# Patient Record
Sex: FEMALE | Race: WHITE | NOT HISPANIC OR LATINO
[De-identification: names, ages, dates, MRNs, and addresses within clinical notes are randomized per-mention and may not be internally consistent; named-entity substitution may affect disease eponyms.]

---

## 2023-05-25 ENCOUNTER — NON-APPOINTMENT (OUTPATIENT)
Age: 38
End: 2023-05-25

## 2023-05-26 VITALS
HEART RATE: 130 BPM | WEIGHT: 293 LBS | SYSTOLIC BLOOD PRESSURE: 128 MMHG | HEIGHT: 63 IN | TEMPERATURE: 103 F | OXYGEN SATURATION: 99 % | RESPIRATION RATE: 20 BRPM | DIASTOLIC BLOOD PRESSURE: 90 MMHG

## 2023-05-26 LAB
ALBUMIN SERPL ELPH-MCNC: 3.7 G/DL — SIGNIFICANT CHANGE UP (ref 3.3–5)
ALP SERPL-CCNC: 81 U/L — SIGNIFICANT CHANGE UP (ref 40–120)
ALT FLD-CCNC: 32 U/L — SIGNIFICANT CHANGE UP (ref 10–45)
ANION GAP SERPL CALC-SCNC: 12 MMOL/L — SIGNIFICANT CHANGE UP (ref 5–17)
APPEARANCE UR: CLEAR — SIGNIFICANT CHANGE UP
APTT BLD: 27.7 SEC — SIGNIFICANT CHANGE UP (ref 27.5–35.5)
AST SERPL-CCNC: 30 U/L — SIGNIFICANT CHANGE UP (ref 10–40)
BACTERIA # UR AUTO: PRESENT /HPF
BASOPHILS # BLD AUTO: 0.03 K/UL — SIGNIFICANT CHANGE UP (ref 0–0.2)
BASOPHILS NFR BLD AUTO: 0.2 % — SIGNIFICANT CHANGE UP (ref 0–2)
BILIRUB SERPL-MCNC: 0.3 MG/DL — SIGNIFICANT CHANGE UP (ref 0.2–1.2)
BILIRUB UR-MCNC: NEGATIVE — SIGNIFICANT CHANGE UP
BUN SERPL-MCNC: 12 MG/DL — SIGNIFICANT CHANGE UP (ref 7–23)
CALCIUM SERPL-MCNC: 9.2 MG/DL — SIGNIFICANT CHANGE UP (ref 8.4–10.5)
CHLORIDE SERPL-SCNC: 103 MMOL/L — SIGNIFICANT CHANGE UP (ref 96–108)
CO2 SERPL-SCNC: 20 MMOL/L — LOW (ref 22–31)
COLOR SPEC: YELLOW — SIGNIFICANT CHANGE UP
CREAT SERPL-MCNC: 1.03 MG/DL — SIGNIFICANT CHANGE UP (ref 0.5–1.3)
DIFF PNL FLD: ABNORMAL
EGFR: 72 ML/MIN/1.73M2 — SIGNIFICANT CHANGE UP
EOSINOPHIL # BLD AUTO: 0.03 K/UL — SIGNIFICANT CHANGE UP (ref 0–0.5)
EOSINOPHIL NFR BLD AUTO: 0.2 % — SIGNIFICANT CHANGE UP (ref 0–6)
EPI CELLS # UR: SIGNIFICANT CHANGE UP /HPF (ref 0–5)
GLUCOSE SERPL-MCNC: 114 MG/DL — HIGH (ref 70–99)
GLUCOSE UR QL: NEGATIVE — SIGNIFICANT CHANGE UP
HCT VFR BLD CALC: 30.3 % — LOW (ref 34.5–45)
HGB BLD-MCNC: 9.7 G/DL — LOW (ref 11.5–15.5)
IMM GRANULOCYTES NFR BLD AUTO: 0.5 % — SIGNIFICANT CHANGE UP (ref 0–0.9)
INR BLD: 1.11 — SIGNIFICANT CHANGE UP (ref 0.88–1.16)
KETONES UR-MCNC: NEGATIVE — SIGNIFICANT CHANGE UP
LACTATE SERPL-SCNC: 1.9 MMOL/L — SIGNIFICANT CHANGE UP (ref 0.5–2)
LEUKOCYTE ESTERASE UR-ACNC: NEGATIVE — SIGNIFICANT CHANGE UP
LYMPHOCYTES # BLD AUTO: 0.58 K/UL — LOW (ref 1–3.3)
LYMPHOCYTES # BLD AUTO: 3.7 % — LOW (ref 13–44)
MCHC RBC-ENTMCNC: 26.4 PG — LOW (ref 27–34)
MCHC RBC-ENTMCNC: 32 GM/DL — SIGNIFICANT CHANGE UP (ref 32–36)
MCV RBC AUTO: 82.6 FL — SIGNIFICANT CHANGE UP (ref 80–100)
MONOCYTES # BLD AUTO: 0.65 K/UL — SIGNIFICANT CHANGE UP (ref 0–0.9)
MONOCYTES NFR BLD AUTO: 4.2 % — SIGNIFICANT CHANGE UP (ref 2–14)
NEUTROPHILS # BLD AUTO: 14.14 K/UL — HIGH (ref 1.8–7.4)
NEUTROPHILS NFR BLD AUTO: 91.2 % — HIGH (ref 43–77)
NITRITE UR-MCNC: NEGATIVE — SIGNIFICANT CHANGE UP
NRBC # BLD: 0 /100 WBCS — SIGNIFICANT CHANGE UP (ref 0–0)
PH UR: 6 — SIGNIFICANT CHANGE UP (ref 5–8)
PLATELET # BLD AUTO: 311 K/UL — SIGNIFICANT CHANGE UP (ref 150–400)
POTASSIUM SERPL-MCNC: 4.2 MMOL/L — SIGNIFICANT CHANGE UP (ref 3.5–5.3)
POTASSIUM SERPL-SCNC: 4.2 MMOL/L — SIGNIFICANT CHANGE UP (ref 3.5–5.3)
PROT SERPL-MCNC: 6.9 G/DL — SIGNIFICANT CHANGE UP (ref 6–8.3)
PROT UR-MCNC: NEGATIVE MG/DL — SIGNIFICANT CHANGE UP
PROTHROM AB SERPL-ACNC: 13.2 SEC — SIGNIFICANT CHANGE UP (ref 10.5–13.4)
RAPID RVP RESULT: SIGNIFICANT CHANGE UP
RBC # BLD: 3.67 M/UL — LOW (ref 3.8–5.2)
RBC # FLD: 15.8 % — HIGH (ref 10.3–14.5)
RBC CASTS # UR COMP ASSIST: ABNORMAL /HPF
SARS-COV-2 RNA SPEC QL NAA+PROBE: SIGNIFICANT CHANGE UP
SODIUM SERPL-SCNC: 135 MMOL/L — SIGNIFICANT CHANGE UP (ref 135–145)
SP GR SPEC: 1.01 — SIGNIFICANT CHANGE UP (ref 1–1.03)
UROBILINOGEN FLD QL: 0.2 E.U./DL — SIGNIFICANT CHANGE UP
WBC # BLD: 15.51 K/UL — HIGH (ref 3.8–10.5)
WBC # FLD AUTO: 15.51 K/UL — HIGH (ref 3.8–10.5)
WBC UR QL: < 5 /HPF — SIGNIFICANT CHANGE UP

## 2023-05-26 PROCEDURE — 71275 CT ANGIOGRAPHY CHEST: CPT | Mod: 26,MA

## 2023-05-26 PROCEDURE — 99285 EMERGENCY DEPT VISIT HI MDM: CPT

## 2023-05-26 RX ORDER — SODIUM CHLORIDE 9 MG/ML
1000 INJECTION INTRAMUSCULAR; INTRAVENOUS; SUBCUTANEOUS ONCE
Refills: 0 | Status: COMPLETED | OUTPATIENT
Start: 2023-05-26 | End: 2023-05-26

## 2023-05-26 RX ORDER — IBUPROFEN 200 MG
800 TABLET ORAL ONCE
Refills: 0 | Status: COMPLETED | OUTPATIENT
Start: 2023-05-26 | End: 2023-05-26

## 2023-05-26 RX ADMIN — SODIUM CHLORIDE 1000 MILLILITER(S): 9 INJECTION INTRAMUSCULAR; INTRAVENOUS; SUBCUTANEOUS at 19:36

## 2023-05-26 RX ADMIN — Medication 800 MILLIGRAM(S): at 19:36

## 2023-05-26 RX ADMIN — Medication 800 MILLIGRAM(S): at 20:11

## 2023-05-26 RX ADMIN — SODIUM CHLORIDE 1000 MILLILITER(S): 9 INJECTION INTRAMUSCULAR; INTRAVENOUS; SUBCUTANEOUS at 20:00

## 2023-05-26 NOTE — ED ADULT TRIAGE NOTE - CCCP TRG CHIEF CMPLNT
----- Message from Sallie Otero PA-C sent at 9/30/2022 10:33 AM CDT -----  Pls let pt know I filled out the paperwork yesterday and got a faxed confirmation receipt.  When we hear a response, we will let her know. Thanks, Sallie     shortness of breath

## 2023-05-26 NOTE — ED PROVIDER NOTE - CLINICAL SUMMARY MEDICAL DECISION MAKING FREE TEXT BOX
Given persistent diffuse symptoms, concern for viral syndrome, either recurrent or persistent covid in the setting of paxovid use vs other viral syndrome, will send viral testing for further diagnostic clarity.  Given extended duration of symptoms, must r/o acute thoracic process ?myocarditis ?PE ?pneumonia ?pneumothorax.  EKG without STEMI, r/o ACS given chest pressure.  R/o gross metabolic abnormality ?brandyn ?hypernatremia ?anemia given vomiting and diarrhea.

## 2023-05-26 NOTE — ED PROVIDER NOTE - NS ED ROS FT
REVIEW OF SYSTEMS  positive for: cough, shortness of breath, fever, vomiting, diarrhea, myalgias,   negative for: headache, eye pain, runny nose, sore throat, chest pain, stomach pain, dysuria, rash no previous reaction

## 2023-05-26 NOTE — ED PROVIDER NOTE - PHYSICAL EXAMINATION
General: comfortable, resting in ED  HEENT: atraumatic, no eye erythema or discharge  Pulm: no cyanosis, no added work of breathing  Cardiac: extremities warm, intact peripheral pulse  GI: abdomen soft, abdomen nontender  Neuro: alert, conversant  Psych: neutral affect, cooperative  Msk: no gross deformity or instability  Skin: no erythema or rash

## 2023-05-26 NOTE — ED ADULT NURSE NOTE - NSFALLUNIVINTERV_ED_ALL_ED
Bed/Stretcher in lowest position, wheels locked, appropriate side rails in place/Call bell, personal items and telephone in reach/Instruct patient to call for assistance before getting out of bed/chair/stretcher/Non-slip footwear applied when patient is off stretcher/[place] to call system/Physically safe environment - no spills, clutter or unnecessary equipment/Purposeful proactive rounding/Room/bathroom lighting operational, light cord in reach

## 2023-05-26 NOTE — ED PROVIDER NOTE - NSFOLLOWUPINSTRUCTIONS_ED_ALL_ED_FT
Viral Illness, Adult  Viruses are tiny germs that can get into a person's body and cause illness. There are many different types of viruses, and they cause many types of illness. Viral illnesses can range from mild to severe. They can affect various parts of the body.    Short-term conditions that are caused by a virus include colds and the flu (influenza). Long-term conditions that are caused by a virus include herpes, shingles, and HIV (human immunodeficiency virus) infection. A few viruses have been linked to certain cancers.    What are the causes?  Many types of viruses can cause illness. Viruses invade cells in your body, multiply, and cause the infected cells to work abnormally or die. When these cells die, they release more of the virus. When this happens, you develop symptoms of the illness, and the virus continues to spread to other cells. If the virus takes over the function of the cell, it can cause the cell to divide and grow out of control. This happens when a virus causes cancer.    Different viruses get into the body in different ways. You can get a virus by:  Swallowing food or water that has come in contact with the virus (is contaminated).  Breathing in droplets that have been coughed or sneezed into the air by an infected person.  Touching a surface that has been contaminated with the virus and then touching your eyes, nose, or mouth.  Being bitten by an insect or animal that carries the virus.  Having sexual contact with a person who is infected with the virus.  Being exposed to blood or fluids that contain the virus, either through an open cut or during a transfusion.  If a virus enters your body, your body's defense system (immune system) will try to fight the virus. You may be at higher risk for a viral illness if your immune system is weak.    What are the signs or symptoms?  You may have these symptoms, depending on the type of virus and the location of the cells that it invades:  Cold and flu viruses:  Fever.  Headache.  Sore throat.  Muscle aches.  Stuffy nose (nasal congestion).  Cough.  Digestive system (gastrointestinal) viruses:  Fever.  Pain in the abdomen.  Nausea.  Diarrhea.  Liver viruses (hepatitis):  Loss of appetite.  Tiredness.  Skin or the white parts of your eyes turning yellow (jaundice).  Brain and spinal cord viruses:  Fever.  Headache.  Stiff neck.  Nausea and vomiting.  Confusion or sleepiness.  Skin viruses:  Warts.  Itching.  Rash.  Sexually transmitted viruses:  Discharge.  Swelling.  Redness.  Rash.  How is this diagnosed?  This condition may be diagnosed based on one or more of the following:  Symptoms.  Medical history.  Physical exam.  Blood test, sample of mucus from your lungs (sputum sample), stool sample, or a swab of body fluids or a skin sore (lesion).  How is this treated?  Viruses can be hard to treat because they live within cells. Antibiotic medicines do not treat viruses because these medicines do not get inside cells. Treatment for a viral illness may include:  Resting and drinking plenty of fluids.  Medicines to relieve symptoms. These can include over-the-counter medicine for pain and fever, medicines for cough or congestion, and medicines to relieve diarrhea.  Antiviral medicines. These medicines are available only for certain types of viruses.  Some viral illnesses can be prevented with vaccinations. A common example is the flu shot.    Follow these instructions at home:  Medicines    Take over-the-counter and prescription medicines only as told by your health care provider.  If you were prescribed an antiviral medicine, take it as told by your health care provider. Do not stop taking the antiviral even if you start to feel better.  Be aware of when antibiotics are needed and when they are not needed. Antibiotics do not treat viruses. You may get an antibiotic if your health care provider thinks that you may have, or are at risk for, a bacterial infection and you have a viral infection.  Do not ask for an antibiotic prescription if you have been diagnosed with a viral illness. Antibiotics will not make your illness go away faster.  Frequently taking antibiotics when they are not needed can lead to antibiotic resistance. When this develops, the medicine no longer works against the bacteria that it normally fights.  General instructions      Drink enough fluids to keep your urine pale yellow.  Rest as much as possible.  Return to your normal activities as told by your health care provider. Ask your health care provider what activities are safe for you.  Keep all follow-up visits as told by your health care provider. This is important.  How is this prevented?    To reduce your risk of viral illness:  Wash your hands often with soap and water for at least 20 seconds. If soap and water are not available, use hand .  Avoid touching your nose, eyes, and mouth, especially if you have not washed your hands recently.  If anyone in your household has a viral infection, clean all household surfaces that may have been in contact with the virus. Use soap and hot water. You may also use bleach that you have added water to (diluted).  Stay away from people who are sick with symptoms of a viral infection.  Do not share items such as toothbrushes and water bottles with other people.  Keep your vaccinations up to date. This includes getting a yearly flu shot.  Eat a healthy diet and get plenty of rest.  Contact a health care provider if:  You have symptoms of a viral illness that do not go away.  Your symptoms come back after going away.  Your symptoms get worse.  Get help right away if you have:  Trouble breathing.  A severe headache or a stiff neck.  Severe vomiting or pain in your abdomen.  These symptoms may represent a serious problem that is an emergency. Do not wait to see if the symptoms will go away. Get medical help right away. Call your local emergency services (911 in the [place] Do not drive yourself to the hospital.    Summary  Viruses are types of germs that can get into a person's body and cause illness. Viral illnesses can range from mild to severe. They can affect various parts of the body.  Viruses can be hard to treat. There are medicines to relieve symptoms, and there are some antiviral medicines.  If you were prescribed an antiviral medicine, take it as told by your health care provider. Do not stop taking the antiviral even if you start to feel better.  Contact a health care provider if you have symptoms of a viral illness that do not go away.  This information is not intended to replace advice given to you by your health care provider. Make sure you discuss any questions you have with your health care provider.

## 2023-05-26 NOTE — ED PROVIDER NOTE - PROGRESS NOTE DETAILS
Will sign out to Dr. Townsend: 37F with recent covid infection s/p paxlovid, now with recurrent viral syndrome (shortness of breath, vomiting, diarrhea, myalgias), pending CT chest read r/o PNA + PE, remainder of workup including labs and UA unremarkable.

## 2023-05-26 NOTE — ED PROVIDER NOTE - NSICDXNOPASTMEDICALHX_GEN_ALL_ED
TALKED TO DR. ZACARIAS Click to add NO pertinent Past Medical History

## 2023-05-26 NOTE — ED ADULT TRIAGE NOTE - CHIEF COMPLAINT QUOTE
Pt presents to ED C/O worsening cough, SOB, chest pressure and fever at home since COV+ Dx x 4weeks ago. Sent from  for R/O PE. EKG in progress. Denies PMH. Reports on menses.

## 2023-05-26 NOTE — ED ADULT NURSE NOTE - OBJECTIVE STATEMENT
Patient states had Covid + 1 month ago, since then has not been feeling well, c/o of fever, headache, body aches and cough with fever.  Immediate upgrade sepsis upgrade.

## 2023-05-26 NOTE — ED PROVIDER NOTE - OBJECTIVE STATEMENT
37F with no past medical history, follows at PCP, with covid 1 month ago s/p paxlovid, now with persistent fever / shortness of breath / cough / chest pressure, with vomiting and diarrhea, seen at urgent care, sent to ED r/o PE.

## 2023-05-27 ENCOUNTER — INPATIENT (INPATIENT)
Facility: HOSPITAL | Age: 38
LOS: 0 days | Discharge: HOME | DRG: 315 | End: 2023-05-28
Attending: STUDENT IN AN ORGANIZED HEALTH CARE EDUCATION/TRAINING PROGRAM | Admitting: STUDENT IN AN ORGANIZED HEALTH CARE EDUCATION/TRAINING PROGRAM
Payer: COMMERCIAL

## 2023-05-27 DIAGNOSIS — R65.10 SYSTEMIC INFLAMMATORY RESPONSE SYNDROME (SIRS) OF NON-INFECTIOUS ORIGIN WITHOUT ACUTE ORGAN DYSFUNCTION: ICD-10-CM

## 2023-05-27 DIAGNOSIS — Z29.9 ENCOUNTER FOR PROPHYLACTIC MEASURES, UNSPECIFIED: ICD-10-CM

## 2023-05-27 DIAGNOSIS — I28.1 ANEURYSM OF PULMONARY ARTERY: ICD-10-CM

## 2023-05-27 DIAGNOSIS — D64.9 ANEMIA, UNSPECIFIED: ICD-10-CM

## 2023-05-27 LAB
BLD GP AB SCN SERPL QL: NEGATIVE — SIGNIFICANT CHANGE UP
BLD GP AB SCN SERPL QL: NEGATIVE — SIGNIFICANT CHANGE UP
CRP SERPL-MCNC: 60.6 MG/L — HIGH (ref 0–4)
ERYTHROCYTE [SEDIMENTATION RATE] IN BLOOD: 46 MM/HR — HIGH
FERRITIN SERPL-MCNC: 31 NG/ML — SIGNIFICANT CHANGE UP (ref 15–150)
IRON SATN MFR SERPL: 23 UG/DL — LOW (ref 30–160)
IRON SATN MFR SERPL: 7 % — LOW (ref 14–50)
PROCALCITONIN SERPL-MCNC: 1.34 NG/ML — HIGH (ref 0.02–0.1)
RH IG SCN BLD-IMP: POSITIVE — SIGNIFICANT CHANGE UP
TIBC SERPL-MCNC: 327 UG/DL — SIGNIFICANT CHANGE UP (ref 220–430)
TRANSFERRIN SERPL-MCNC: 268 MG/DL — SIGNIFICANT CHANGE UP (ref 200–360)
UIBC SERPL-MCNC: 304 UG/DL — SIGNIFICANT CHANGE UP (ref 110–370)

## 2023-05-27 PROCEDURE — 99233 SBSQ HOSP IP/OBS HIGH 50: CPT | Mod: GC

## 2023-05-27 PROCEDURE — 99223 1ST HOSP IP/OBS HIGH 75: CPT | Mod: GC

## 2023-05-27 RX ORDER — ONDANSETRON 8 MG/1
4 TABLET, FILM COATED ORAL EVERY 8 HOURS
Refills: 0 | Status: DISCONTINUED | OUTPATIENT
Start: 2023-05-27 | End: 2023-05-28

## 2023-05-27 RX ORDER — LANOLIN ALCOHOL/MO/W.PET/CERES
3 CREAM (GRAM) TOPICAL AT BEDTIME
Refills: 0 | Status: DISCONTINUED | OUTPATIENT
Start: 2023-05-27 | End: 2023-05-28

## 2023-05-27 RX ORDER — POLYETHYLENE GLYCOL 3350 17 G/17G
17 POWDER, FOR SOLUTION ORAL ONCE
Refills: 0 | Status: COMPLETED | OUTPATIENT
Start: 2023-05-27 | End: 2023-05-27

## 2023-05-27 RX ORDER — FAMOTIDINE 10 MG/ML
20 INJECTION INTRAVENOUS DAILY
Refills: 0 | Status: DISCONTINUED | OUTPATIENT
Start: 2023-05-27 | End: 2023-05-28

## 2023-05-27 RX ORDER — ACETAMINOPHEN 500 MG
650 TABLET ORAL EVERY 6 HOURS
Refills: 0 | Status: DISCONTINUED | OUTPATIENT
Start: 2023-05-27 | End: 2023-05-28

## 2023-05-27 RX ORDER — FERROUS SULFATE 325(65) MG
325 TABLET ORAL
Refills: 0 | Status: DISCONTINUED | OUTPATIENT
Start: 2023-05-27 | End: 2023-05-28

## 2023-05-27 RX ADMIN — FAMOTIDINE 20 MILLIGRAM(S): 10 INJECTION INTRAVENOUS at 22:02

## 2023-05-27 RX ADMIN — POLYETHYLENE GLYCOL 3350 17 GRAM(S): 17 POWDER, FOR SOLUTION ORAL at 12:03

## 2023-05-27 RX ADMIN — Medication 650 MILLIGRAM(S): at 21:13

## 2023-05-27 RX ADMIN — Medication 30 MILLILITER(S): at 02:36

## 2023-05-27 RX ADMIN — Medication 325 MILLIGRAM(S): at 11:40

## 2023-05-27 RX ADMIN — Medication 650 MILLIGRAM(S): at 11:44

## 2023-05-27 RX ADMIN — Medication 30 MILLILITER(S): at 16:48

## 2023-05-27 RX ADMIN — Medication 650 MILLIGRAM(S): at 12:42

## 2023-05-27 NOTE — H&P ADULT - PROBLEM SELECTOR PLAN 4
Plan:  F: s/p 2L NS in the ED   E: replete K<4, Mg<2  N: regular  VTE Prophylaxis: None, Improve 0  GI: home pepcid  C: Full Code  D: [facility]

## 2023-05-27 NOTE — H&P ADULT - PROBLEM/PLAN-1
DISPLAY PLAN FREE TEXT well function A and V leads  see print out  for  final reprogramming details  pt instructed  about  movement restriction aof the l sholder and wound care  f/u in 6 weeks

## 2023-05-27 NOTE — H&P ADULT - NSHPLABSRESULTS_GEN_ALL_CORE
LABS:                        9.7    15.51 )-----------( 311      ( 26 May 2023 19:31 )             30.3     05-26    135  |  103  |  12  ----------------------------<  114<H>  4.2   |  20<L>  |  1.03    Ca    9.2      26 May 2023 19:31    TPro  6.9  /  Alb  3.7  /  TBili  0.3  /  DBili  x   /  AST  30  /  ALT  32  /  AlkPhos  81  05-26    PT/INR - ( 26 May 2023 19:31 )   PT: 13.2 sec;   INR: 1.11          PTT - ( 26 May 2023 19:31 )  PTT:27.7 sec    RADIOLOGY & ADDITIONAL TESTS: Reviewed.

## 2023-05-27 NOTE — H&P ADULT - NSHPPHYSICALEXAM_GEN_ALL_CORE
T(C): 36.7 (05-27-23 @ 02:09), Max: 39.5 (05-26-23 @ 18:35)  HR: 82 (05-27-23 @ 02:09) (82 - 130)  BP: 129/85 (05-27-23 @ 02:09) (114/67 - 129/85)  RR: 18 (05-27-23 @ 02:09) (18 - 20)  SpO2: 99% (05-27-23 @ 02:09) (98% - 100%)    General: NAD, laying in bed, speaking in full sentences  HEENT: head NC/AT, no conjunctival injection, EOMI, MMM  Neck: supple, no JVD  Cardio: RRR, +S1/S2, no M/R/G  Resp: lungs CTAB, no cough/wheezes/rales/rhonchi  Abdo: soft, NT, ND, +bowel sounds x4, no organomegaly or palpable mass    Extremities: WWP, no edema/cyanosis/clubbing   Vasc: 2+ radial and DP pulses b/l  Neuro: A&Ox3  Psych: speech non-pressured, thoughts goal-oriented  Skin: dry, intact, no visible jaundice   MSK: no joint swelling T(C): 36.7 (05-27-23 @ 02:09), Max: 39.5 (05-26-23 @ 18:35)  HR: 82 (05-27-23 @ 02:09) (82 - 130)  BP: 129/85 (05-27-23 @ 02:09) (114/67 - 129/85)  RR: 18 (05-27-23 @ 02:09) (18 - 20)  SpO2: 99% (05-27-23 @ 02:09) (98% - 100%)    General: NAD, laying in bed, speaking in full sentences, young, obese female  HEENT: head NC/AT, no conjunctival injection, EOMI, MMM  Neck: supple, no JVD  Cardio: RRR, +S1/S2, no M/R/G  Resp: lungs CTAB, no cough/wheezes/rales/rhonchi, on room air without increased WOB  Abdo: soft, increased body habitus, NT, ND, +bowel sounds x4, no organomegaly or palpable mass    Extremities: WWP, no edema/cyanosis/clubbing   Vasc: 2+ radial and DP pulses b/l  Neuro: A&Ox3, no focal deficits  Psych: speech non-pressured, thoughts goal-oriented  Skin: dry, intact, no visible jaundice   MSK: no joint swelling

## 2023-05-27 NOTE — CONSULT NOTE ADULT - ASSESSMENT
36 year old female presenting with SOB, palpitations, chills, vomiting, dizziness, worse with standing for 4 days. Sent to urgent care found to be tachycardic and referred to [facility] ER. 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 She has no physical exam findings that would fit with bechets disease or other autoimmune diseases, but will send the work up looking for secondary causes. She does have elevated WBC count with no signs of infection raising concern for underlying vasculitis. Will need to obtain echo to look for congential causes and if this is normal then patient can follow up remainder of work up as an outpatient. Will need full PAH work up as well after the echo if PH is present. ESR and CRP were Elevated fitting with possible underlying vasculitis but no clinical signs    - ECHO for congenital heart defects  - Connective tissue workup looking for secondary causes. RAFAEL, RF, anti CCP, ANCA, ESR, CRP, scleroderma antibodies  - If PH is present will need full PAH work up but can be done outpatient.

## 2023-05-27 NOTE — PATIENT PROFILE ADULT - FALL HARM RISK - UNIVERSAL INTERVENTIONS
Bed in lowest position, wheels locked, appropriate side rails in place/Call bell, personal items and telephone in reach/Instruct patient to call for assistance before getting out of bed or chair/Non-slip footwear when patient is out of bed/[place] to call system/Physically safe environment - no spills, clutter or unnecessary equipment/Purposeful Proactive Rounding/Room/bathroom lighting operational, light cord in reach

## 2023-05-27 NOTE — H&P ADULT - ASSESSMENT
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

## 2023-05-27 NOTE — CONSULT NOTE ADULT - ASSESSMENT
37y Female no PMHx, PSHx ovarian cyst removed, hernia repair, lap band, with recent COVID 1 month ago s/p Paxlovid went to urgent care today with persistent SOB, heart burn, and vomiting. She was found to be febrile and tachycardic, so she was sent to the ED ro r/o pulmonary embolism., In ED, Temp 103, tachcardic 110, SBP 120s, on room air, hemodynamically stable, labs significnat for WBC 15, H&H 10/30. 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 CT surgery consulted.     Plan:  Problem 1: PA aneursym  -Will discuss final plan with Dr. Mayorga in AM  -Patient stable with incidental finding 3.6cm PA aneursym  -Will likely be outpatient follow up for follow up scan, timing TBD    Problem 2: SOB  -CT negative pulmonary embolism  -will be admitted to medicine work up fever, r/o vasculitis     I have reviewed clinical labs tests and reports, radiology tests and reports, as well as old patient medical records, and discussed with the refering physician.

## 2023-05-27 NOTE — H&P ADULT - PROBLEM SELECTOR PLAN 3
Hgb on admission 9.4, MCV 82.6. Likely 2/2 SUSANA i/s/o heavy menses. Per patient, she has a history of heavy menses (currently active). No hematuria, hematochezia, or melena. Patient takes supplemental PO iron.   - obtain iron panel  - trend CBC  - maintain active T&S  - transfuse if Hgb <7

## 2023-05-27 NOTE — H&P ADULT - PROBLEM SELECTOR PLAN 2
Patient found to have suspected incidental finding of 3.6cm aneurysm in the main pulmonary artery on imaging. Patient is currently on room air and asymptomatic.    - CT surgery and Vascular consulted - no acute interventions at this time  - Pulm consulted, recommended vasculitis vs connective tissue work-up  - Obtain RAFAEL, ANCA, CCP, ESR, CRP, Anti-Laya-1, Anit-MI2, Aldolase, C3, C4  - Obtain TTE

## 2023-05-27 NOTE — H&P ADULT - ATTENDING COMMENTS
36 y/o female w/ no pmhx presenting for acute onset of fever, dyspepsia, chest pain and dyspnea - found to incidentally have isolated dilatation of the pulmonary artery. Unclear etiology of her dilatation. Ddx includes pulmonary hypertension vs post infectious vs CHD vs idiopathic/congenital. She has no clear signs or symptoms of autoimmune disease w/ no arthralgias, rashes, mucosal ulcerations or tendinopathies. No family history of autoimmune disease. She has no evidence of PH by history and no predisposing conditions. Recently had COVID however isolated pulmonary artery dilatation not typically described. She has defervesced and has not had recurrence of her fevers, ?viral syndrome.     Plan:   - TTE  - Pulm + CTS consultations   - autoimmune w/u

## 2023-05-27 NOTE — H&P ADULT - HISTORY OF PRESENT ILLNESS
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 Patient endorsed progressively worsening SOB and JACKSON 4 days ago, which she initially attributed to long-COVID symptoms. Symptoms initially aborted after ASA 81. Today, patient experienced worsening SOB/JACKSON with new onset chills, palpitations, multiple episodes of vomiting, and dizziness. She visited an urgent care, who called EMS due to tachycardia and SOB. Patient was subsequently brought to [facility] ED c/f PE. Patient is from [place], [place] and frequently travels back and forth from [facility] for work. Denies other extensive long travel, history of DVT/PE. leg swelling, or sick contacts. Currently, patient denies any nausea, vomiting, headache, acute sob, chest pain, abdominal pain, genitourinary sx, extremity pain or swelling.     PSHx ovarian cyst removed, hernia repair, lap band  FHx: M - DM; F - CAD  A: NKDA  M: Vitamin D, PO iron, MV    ED Course:  Vitals: 103.1F, , /90, RR 20(96%) on Room air  Labs: WBC 15, Neutrophils 91%, Hgb 9.7, HCO3 20, Glucose 114, Lactate 1.9, Trop neg, U/A neg.  Imaging: CTPE - no central PE, 3.6cm focal outpouching of main pulm artery c/f Pulm artery aneurysm.  EKG: Sinus tachycardia  Consults: Vascular, CTS, Pulm  Interventions: Tylenol 800mg, 1L NS

## 2023-05-27 NOTE — CONSULT NOTE ADULT - SUBJECTIVE AND OBJECTIVE BOX
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 Patient endorsed progressively worsening SOB and JACKSON 4 days ago, which she initially attributed to long-COVID symptoms. Symptoms initially aborted after ASA 81. Today, patient experienced worsening SOB/JACKSON with new onset chills, palpitations, multiple episodes of vomiting, and dizziness. She visited an urgent care, who called EMS due to tachycardia and SOB. Patient was subsequently brought to [facility] ED c/f PE. Patient is from [place], [place] and frequently travels back and forth from [facility] for work. Denies other extensive long travel, history of DVT/PE. leg swelling, or sick contacts. Currently, patient denies any nausea, vomiting, headache, acute sob, chest pain, abdominal pain, genitourinary sx, extremity pain or swelling.     She denies any hx of autoimmune disease or oral ulcerations. She dies family hx of autoimmune diseases that she is aware of or any congenital issues that she is aware.    PSHx ovarian cyst removed, hernia repair, lap band  FHx: M - DM; F - CAD  A: NKDA  M: Vitamin D, PO iron, MV    ED Course:  Vitals: 103.1F, , /90, RR 20(96%) on Room air  Labs: WBC 15, Neutrophils 91%, Hgb 9.7, HCO3 20, Glucose 114, Lactate 1.9, Trop neg, U/A neg.  Imaging: CTPE - no central PE, 3.6cm focal outpouching of main pulm artery c/f Pulm artery aneurysm.  EKG: Sinus tachycardia  Consults: Vascular, CTS, Pulm  Interventions: Tylenol 800mg, 1L NS (27 May 2023 02:35)      REVIEW OF SYSTEMS:  All additional ROS negative.    PAST MEDICAL & SURGICAL HISTORY:  No pertinent past medical history      No significant past surgical history          FAMILY HISTORY:      SOCIAL HISTORY:  Smoking Status: [ ] Current, [ ] Former, [ ] Never  Pack Years:    MEDICATIONS:  Pulmonary:    Antimicrobials:    Anticoagulants:    Onc:    GI/:  aluminum hydroxide/magnesium hydroxide/simethicone Suspension 30 milliLiter(s) Oral every 4 hours PRN    Endocrine:    Cardiac:    Other Medications:  acetaminophen     Tablet .. 650 milliGRAM(s) Oral every 6 hours PRN  ferrous    sulfate 325 milliGRAM(s) Oral <User Schedule>  melatonin 3 milliGRAM(s) Oral at bedtime PRN  ondansetron Injectable 4 milliGRAM(s) IV Push every 8 hours PRN      Allergies    No Known Allergies    Intolerances        Vital Signs Last 24 Hrs  T(C): 36.9 (27 May 2023 20:49), Max: 37.3 (26 May 2023 21:26)  T(F): 98.4 (27 May 2023 20:49), Max: 99.2 (26 May 2023 21:26)  HR: 92 (27 May 2023 20:49) (73 - 97)  BP: 138/86 (27 May 2023 20:49) (111/75 - 138/86)  BP(mean): --  RR: 17 (27 May 2023 20:49) (17 - 19)  SpO2: 98% (27 May 2023 20:49) (98% - 100%)    Parameters below as of 27 May 2023 20:49  Patient On (Oxygen Delivery Method): room air            PHYSICAL EXAM:    Head: NC/AT  EENT: PERRL, anicteric sclera; oropharynx clear, MMM  Neck: supple, no appreciable JVD  Respiratory: CTA B/L; no W/R/R  Cardiovascular: +S1/S2, RRR  Gastrointestinal: soft, NT/ND  Extremities: WWP; no edema, clubbing or cyanosis  Vascular: 2+ radial pulses B/L  Neurological: awake and alert; RIOS    LABS:      CBC Full  -  ( 26 May 2023 19:31 )  WBC Count : 15.51 K/uL  RBC Count : 3.67 M/uL  Hemoglobin : 9.7 g/dL  Hematocrit : 30.3 %  Platelet Count - Automated : 311 K/uL  Mean Cell Volume : 82.6 fl  Mean Cell Hemoglobin : 26.4 pg  Mean Cell Hemoglobin Concentration : 32.0 gm/dL  Auto Neutrophil # : 14.14 K/uL  Auto Lymphocyte # : 0.58 K/uL  Auto Monocyte # : 0.65 K/uL  Auto Eosinophil # : 0.03 K/uL  Auto Basophil # : 0.03 K/uL  Auto Neutrophil % : 91.2 %  Auto Lymphocyte % : 3.7 %  Auto Monocyte % : 4.2 %  Auto Eosinophil % : 0.2 %  Auto Basophil % : 0.2 %    05-26    135  |  103  |  12  ----------------------------<  114<H>  4.2   |  20<L>  |  1.03    Ca    9.2      26 May 2023 19:31    TPro  6.9  /  Alb  3.7  /  TBili  0.3  /  DBili  x   /  AST  30  /  ALT  32  /  AlkPhos  81      PT/INR - ( 26 May 2023 19:31 )   PT: 13.2 sec;   INR: 1.11          PTT - ( 26 May 2023 19:31 )  PTT:27.7 sec      Urinalysis Basic - ( 26 May 2023 19:58 )    Color: Yellow / Appearance: Clear / S.015 / pH: x  Gluc: x / Ketone: NEGATIVE  / Bili: Negative / Urobili: 0.2 E.U./dL   Blood: x / Protein: NEGATIVE mg/dL / Nitrite: NEGATIVE   Leuk Esterase: NEGATIVE / RBC: 5-10 /HPF / WBC < 5 /HPF   Sq Epi: x / Non Sq Epi: x / Bacteria: Present /HPF                RADIOLOGY & ADDITIONAL STUDIES:    < from: CT Angio Chest PE Protocol w/ IV Cont (23 @ 21:21) >    ACC: [id number] EXAM:  CT ANGIO CHEST PULM [facility]   ORDERED BY: SUZI BRANDON     PROCEDURE DATE:  2023          INTERPRETATION:  CLINICAL INFORMATION: hx covid, chest pressure,   shortness of breath.    COMPARISON: None    CONTRAST/COMPLICATIONS:  IV Contrast: Isovue 370  73 cc administered   27 cc discarded  Oral Contrast: NONE  Complications: None reported at time of study completion    PROCEDURE:  CT Angiography of the Chest.  Sagittal and coronal reformats were performed as well as 3D (MIP)   reconstructions.    FINDINGS:  LUNGS AND AIRWAYS: Patent central airways.  Lungs are clear. There is no   central pulmonary embolism. Focal outpouching of the main pulmonary   artery, measuring 3.6 cm transversely.No evidence of right heartstrain.  PLEURA: No pleural effusion.  MEDIASTINUM AND RIDDHI: No lymphadenopathy.  VESSELS: Within normal limits.  HEART: Heart size is normal. No pericardial effusion.  CHEST WALL AND LOWER NECK: Within normal limits.  VISUALIZED UPPER ABDOMEN: Hepatic steatosis. Splenomegaly.  BONES: Mild degenerative changes.    IMPRESSION:  There is a 3.6 cm focal outpouching of the main pulmonary artery, which   could represent pulmonary artery aneurysm.    No central pulmonary embolism.        --- End of Report ---          ABEBE JONES MD; Resident Radiologist  This document has been electronically signed.  LOULOU CANO MD; Attending Radiologist  This document has been electronically signed. May 27 2023 12:34AM    < end of copied text >  
Surgeon: Dr. Mayorga    Requesting Physician: Dr Townsend (ED)     HISTORY OF PRESENT ILLNESS (Need 4):  37y Female no PMHx, PSHx ovarian cyst removed, hernia repair, lap band, with recent COVID 1 month ago s/p Paxlovid went to urgent care today with persistent SOB, heart burn, and vomiting. She was found to be febrile and tachycardic, so she was sent to the ED ro r/o pulmonary embolism., In ED, Temp 103, tachcardic 110, SBP 120s, on room air, hemodynamically stable, labs significnat for WBC 15, H&H 10/30. 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 CT surgery consulted.     PAST MEDICAL & SURGICAL HISTORY:  No pertinent past medical history      No significant past surgical history          MEDICATIONS  (STANDING):    MEDICATIONS  (PRN):      Allergies    No Known Allergies    Intolerances        SOCIAL HISTORY:  Smoker: No  ETOH use: NO  Ilicit Drug use: Edibles every 2 weeks    FAMILY HISTORY:      Review of Systems (Need 10):  CONSTITUTIONAL: +fever, heart burn Denies chills, sweats, fatigue, weight loss, weight gain                                       NEURO:  Denies parathesias, seizures, syncope, confusion                                                                                  EYES:  Denies blurry vision, discharge, pain, loss of vision                                                                                    ENMT:  Denies difficulty hearing, vertigo, dysphagia, epistaxis, recent dental work                                       CV:  Denies chest pain, palpitations, JACKSON, orthopnea                                                                                           RESPIRATORY: +SOB, Denies Wheezing, cough / sputum, hemoptysis                                                               GI:  Denies nausea, vomiting, diarrhea, constipation, melena                                                                          : Denies hematuria, dysuria, urgency, incontinence                                                                                          MUSKULOSKELETAL:  Denies arthritis, joint swelling, muscle weakness                                                             SKIN/BREAST:  Denies rash, itching, hair loss, masses                                                                                              PSYCH:  Denies depression, anxiety, suicidal ideation                                                                                                HEME/LYMPH:  Denies bruises easily, enlarged lymph nodes, tender lymph nodes                                          ENDOCRINE:  Denies cold intolerance, heat intolerance, polydipsia                                                                      Vital Signs Last 24 Hrs  T(C): 36.7 (27 May 2023 00:57), Max: 39.5 (26 May 2023 18:35)  T(F): 98.1 (27 May 2023 00:57), Max: 103.1 (26 May 2023 18:35)  HR: 94 (27 May 2023 00:57) (92 - 130)  BP: 115/78 (27 May 2023 00:57) (114/67 - 128/90)  BP(mean): --  RR: 19 (27 May 2023 00:57) (19 - 20)  SpO2: 100% (27 May 2023 00:57) (98% - 100%)    Parameters below as of 27 May 2023 00:57  Patient On (Oxygen Delivery Method): room air        Physical Exam (Need 8)  CONSTITUTIONAL:  Sitting in comfortably in stretcher, NAD, obese                                                                          NEURO:   AAox3, no neuro deficits noted, CN grossly intact                  EYES:    WNL  ENMT:   WNL  CV:   s1s rrr  RESPIRATORY:   CTA b/l, no w/r/r  GI: +BS, soft, NT/ND  : no iglesias, deferred   MUSKULOSKELETAL: wwp, no edema, no calf tenderness, palpable peripheral pulses b/l  SKIN / BREAST: WNL                                                          LABS:                        9.7    15.51 )-----------( 311      ( 26 May 2023 19:31 )             30.3         135  |  103  |  12  ----------------------------<  114<H>  4.2   |  20<L>  |  1.03    Ca    9.2      26 May 2023 19:31    TPro  6.9  /  Alb  3.7  /  TBili  0.3  /  DBili  x   /  AST  30  /  ALT  32  /  AlkPhos  81      PT/INR - ( 26 May 2023 19:31 )   PT: 13.2 sec;   INR: 1.11          PTT - ( 26 May 2023 19:31 )  PTT:27.7 sec  Urinalysis Basic - ( 26 May 2023 19:58 )    Color: Yellow / Appearance: Clear / S.015 / pH: x  Gluc: x / Ketone: NEGATIVE  / Bili: Negative / Urobili: 0.2 E.U./dL   Blood: x / Protein: NEGATIVE mg/dL / Nitrite: NEGATIVE   Leuk Esterase: NEGATIVE / RBC: 5-10 /HPF / WBC < 5 /HPF   Sq Epi: x / Non Sq Epi: x / Bacteria: Present /HPF      CARDIAC MARKERS ( 26 May 2023 19:31 )  x     / 0.01 ng/mL / x     / x     / x              RADIOLOGY & ADDITIONAL STUDIES:  CAROTID U/S:    CXR:    CT Scan:  < from: CT Angio Chest PE Protocol w/ IV Cont (23 @ 21:21) >  FINDINGS:  LUNGS AND AIRWAYS: Patent central airways.  Lungs are clear. There is no   central pulmonary embolism. Focal outpouching of the main pulmonary   artery, measuring 3.6 cm transversely.No evidence of right heartstrain.  PLEURA: No pleural effusion.  MEDIASTINUM AND RIDDHI: No lymphadenopathy.  VESSELS: Within normal limits.  HEART: Heart size is normal. No pericardial effusion.  CHEST WALL AND LOWER NECK: Within normal limits.  VISUALIZED UPPER ABDOMEN: Hepatic steatosis. Splenomegaly.  BONES: Mild degenerative changes.    IMPRESSION:  There is a 3.6 cm focal outpouching of the main pulmonary artery, which   could represent pulmonary artery aneurysm.    No central pulmonary embolism.    < end of copied text >      EKG:    TTE / ALESIA:    Cardiac Cath:

## 2023-05-27 NOTE — H&P ADULT - PROBLEM SELECTOR PLAN 1
On admission met 2/4 SIRS criteria with  103.1F, , WBC 15K without known source. U/A neg. CTPE without consolidation. Lactate 1.9. S/p 1L NS in ED.   - F/u Blood culture and urine culture  - Monitor off Abx for now  - Continue with vasculitis vs connective tissue work-up as below

## 2023-05-28 ENCOUNTER — TRANSCRIPTION ENCOUNTER (OUTPATIENT)
Age: 38
End: 2023-05-28

## 2023-05-28 VITALS
OXYGEN SATURATION: 99 % | RESPIRATION RATE: 17 BRPM | HEART RATE: 75 BPM | SYSTOLIC BLOOD PRESSURE: 109 MMHG | DIASTOLIC BLOOD PRESSURE: 81 MMHG | TEMPERATURE: 98 F

## 2023-05-28 LAB
ALBUMIN SERPL ELPH-MCNC: 3.4 G/DL — SIGNIFICANT CHANGE UP (ref 3.3–5)
ALP SERPL-CCNC: 77 U/L — SIGNIFICANT CHANGE UP (ref 40–120)
ALT FLD-CCNC: 40 U/L — SIGNIFICANT CHANGE UP (ref 10–45)
ANION GAP SERPL CALC-SCNC: 10 MMOL/L — SIGNIFICANT CHANGE UP (ref 5–17)
ANISOCYTOSIS BLD QL: SLIGHT — SIGNIFICANT CHANGE UP
AST SERPL-CCNC: 31 U/L — SIGNIFICANT CHANGE UP (ref 10–40)
BASOPHILS # BLD AUTO: 0.04 K/UL — SIGNIFICANT CHANGE UP (ref 0–0.2)
BASOPHILS NFR BLD AUTO: 0.9 % — SIGNIFICANT CHANGE UP (ref 0–2)
BILIRUB SERPL-MCNC: 0.2 MG/DL — SIGNIFICANT CHANGE UP (ref 0.2–1.2)
BUN SERPL-MCNC: 12 MG/DL — SIGNIFICANT CHANGE UP (ref 7–23)
CALCIUM SERPL-MCNC: 9 MG/DL — SIGNIFICANT CHANGE UP (ref 8.4–10.5)
CHLORIDE SERPL-SCNC: 105 MMOL/L — SIGNIFICANT CHANGE UP (ref 96–108)
CO2 SERPL-SCNC: 23 MMOL/L — SIGNIFICANT CHANGE UP (ref 22–31)
CREAT SERPL-MCNC: 0.75 MG/DL — SIGNIFICANT CHANGE UP (ref 0.5–1.3)
DACRYOCYTES BLD QL SMEAR: SLIGHT — SIGNIFICANT CHANGE UP
EGFR: 105 ML/MIN/1.73M2 — SIGNIFICANT CHANGE UP
EOSINOPHIL # BLD AUTO: 0.12 K/UL — SIGNIFICANT CHANGE UP (ref 0–0.5)
EOSINOPHIL NFR BLD AUTO: 2.6 % — SIGNIFICANT CHANGE UP (ref 0–6)
GLUCOSE SERPL-MCNC: 98 MG/DL — SIGNIFICANT CHANGE UP (ref 70–99)
HCT VFR BLD CALC: 31.6 % — LOW (ref 34.5–45)
HGB BLD-MCNC: 9.8 G/DL — LOW (ref 11.5–15.5)
HYPOCHROMIA BLD QL: SLIGHT — SIGNIFICANT CHANGE UP
LYMPHOCYTES # BLD AUTO: 1.26 K/UL — SIGNIFICANT CHANGE UP (ref 1–3.3)
LYMPHOCYTES # BLD AUTO: 27.2 % — SIGNIFICANT CHANGE UP (ref 13–44)
MAGNESIUM SERPL-MCNC: 2.2 MG/DL — SIGNIFICANT CHANGE UP (ref 1.6–2.6)
MANUAL SMEAR VERIFICATION: SIGNIFICANT CHANGE UP
MCHC RBC-ENTMCNC: 25.5 PG — LOW (ref 27–34)
MCHC RBC-ENTMCNC: 31 GM/DL — LOW (ref 32–36)
MCV RBC AUTO: 82.3 FL — SIGNIFICANT CHANGE UP (ref 80–100)
MICROCYTES BLD QL: SLIGHT — SIGNIFICANT CHANGE UP
MONOCYTES # BLD AUTO: 0.12 K/UL — SIGNIFICANT CHANGE UP (ref 0–0.9)
MONOCYTES NFR BLD AUTO: 2.6 % — SIGNIFICANT CHANGE UP (ref 2–14)
NEUTROPHILS # BLD AUTO: 3.09 K/UL — SIGNIFICANT CHANGE UP (ref 1.8–7.4)
NEUTROPHILS NFR BLD AUTO: 66.7 % — SIGNIFICANT CHANGE UP (ref 43–77)
OVALOCYTES BLD QL SMEAR: SLIGHT — SIGNIFICANT CHANGE UP
PHOSPHATE SERPL-MCNC: 3.1 MG/DL — SIGNIFICANT CHANGE UP (ref 2.5–4.5)
PLAT MORPH BLD: NORMAL — SIGNIFICANT CHANGE UP
PLATELET # BLD AUTO: 274 K/UL — SIGNIFICANT CHANGE UP (ref 150–400)
POIKILOCYTOSIS BLD QL AUTO: SLIGHT — SIGNIFICANT CHANGE UP
POLYCHROMASIA BLD QL SMEAR: SLIGHT — SIGNIFICANT CHANGE UP
POTASSIUM SERPL-MCNC: 4 MMOL/L — SIGNIFICANT CHANGE UP (ref 3.5–5.3)
POTASSIUM SERPL-SCNC: 4 MMOL/L — SIGNIFICANT CHANGE UP (ref 3.5–5.3)
PROT SERPL-MCNC: 6.8 G/DL — SIGNIFICANT CHANGE UP (ref 6–8.3)
RBC # BLD: 3.84 M/UL — SIGNIFICANT CHANGE UP (ref 3.8–5.2)
RBC # FLD: 16 % — HIGH (ref 10.3–14.5)
RBC BLD AUTO: ABNORMAL
SODIUM SERPL-SCNC: 138 MMOL/L — SIGNIFICANT CHANGE UP (ref 135–145)
WBC # BLD: 4.64 K/UL — SIGNIFICANT CHANGE UP (ref 3.8–10.5)
WBC # FLD AUTO: 4.64 K/UL — SIGNIFICANT CHANGE UP (ref 3.8–10.5)

## 2023-05-28 PROCEDURE — 80053 COMPREHEN METABOLIC PANEL: CPT

## 2023-05-28 PROCEDURE — 93321 DOPPLER ECHO F-UP/LMTD STD: CPT

## 2023-05-28 PROCEDURE — 85025 COMPLETE CBC W/AUTO DIFF WBC: CPT

## 2023-05-28 PROCEDURE — 87086 URINE CULTURE/COLONY COUNT: CPT

## 2023-05-28 PROCEDURE — 85610 PROTHROMBIN TIME: CPT

## 2023-05-28 PROCEDURE — 85730 THROMBOPLASTIN TIME PARTIAL: CPT

## 2023-05-28 PROCEDURE — 82085 ASSAY OF ALDOLASE: CPT

## 2023-05-28 PROCEDURE — 83605 ASSAY OF LACTIC ACID: CPT

## 2023-05-28 PROCEDURE — 86200 CCP ANTIBODY: CPT

## 2023-05-28 PROCEDURE — 82728 ASSAY OF FERRITIN: CPT

## 2023-05-28 PROCEDURE — 83735 ASSAY OF MAGNESIUM: CPT

## 2023-05-28 PROCEDURE — 36415 COLL VENOUS BLD VENIPUNCTURE: CPT

## 2023-05-28 PROCEDURE — 86036 ANCA SCREEN EACH ANTIBODY: CPT

## 2023-05-28 PROCEDURE — 85652 RBC SED RATE AUTOMATED: CPT

## 2023-05-28 PROCEDURE — 83516 IMMUNOASSAY NONANTIBODY: CPT

## 2023-05-28 PROCEDURE — 84466 ASSAY OF TRANSFERRIN: CPT

## 2023-05-28 PROCEDURE — 83550 IRON BINDING TEST: CPT

## 2023-05-28 PROCEDURE — 86140 C-REACTIVE PROTEIN: CPT

## 2023-05-28 PROCEDURE — 84100 ASSAY OF PHOSPHORUS: CPT

## 2023-05-28 PROCEDURE — 96360 HYDRATION IV INFUSION INIT: CPT

## 2023-05-28 PROCEDURE — 86900 BLOOD TYPING SEROLOGIC ABO: CPT

## 2023-05-28 PROCEDURE — 86235 NUCLEAR ANTIGEN ANTIBODY: CPT

## 2023-05-28 PROCEDURE — 84145 PROCALCITONIN (PCT): CPT

## 2023-05-28 PROCEDURE — 0225U NFCT DS DNA&RNA 21 SARSCOV2: CPT

## 2023-05-28 PROCEDURE — 86038 ANTINUCLEAR ANTIBODIES: CPT

## 2023-05-28 PROCEDURE — 86160 COMPLEMENT ANTIGEN: CPT

## 2023-05-28 PROCEDURE — 84484 ASSAY OF TROPONIN QUANT: CPT

## 2023-05-28 PROCEDURE — 86850 RBC ANTIBODY SCREEN: CPT

## 2023-05-28 PROCEDURE — 99285 EMERGENCY DEPT VISIT HI MDM: CPT

## 2023-05-28 PROCEDURE — 87184 SC STD DISK METHOD PER PLATE: CPT

## 2023-05-28 PROCEDURE — 87040 BLOOD CULTURE FOR BACTERIA: CPT

## 2023-05-28 PROCEDURE — 99232 SBSQ HOSP IP/OBS MODERATE 35: CPT

## 2023-05-28 PROCEDURE — 86901 BLOOD TYPING SEROLOGIC RH(D): CPT

## 2023-05-28 PROCEDURE — 81001 URINALYSIS AUTO W/SCOPE: CPT

## 2023-05-28 PROCEDURE — 71275 CT ANGIOGRAPHY CHEST: CPT | Mod: MA

## 2023-05-28 PROCEDURE — 83540 ASSAY OF IRON: CPT

## 2023-05-28 RX ORDER — FERROUS SULFATE 325(65) MG
1 TABLET ORAL
Qty: 0 | Refills: 0 | DISCHARGE
Start: 2023-05-28

## 2023-05-28 RX ORDER — FAMOTIDINE 10 MG/ML
1 INJECTION INTRAVENOUS
Qty: 0 | Refills: 0 | DISCHARGE
Start: 2023-05-28

## 2023-05-28 RX ADMIN — Medication 650 MILLIGRAM(S): at 11:33

## 2023-05-28 RX ADMIN — Medication 30 MILLILITER(S): at 17:40

## 2023-05-28 RX ADMIN — Medication 650 MILLIGRAM(S): at 10:36

## 2023-05-28 RX ADMIN — FAMOTIDINE 20 MILLIGRAM(S): 10 INJECTION INTRAVENOUS at 12:11

## 2023-05-28 NOTE — PROGRESS NOTE ADULT - PROBLEM SELECTOR PLAN 4
Plan:  F: s/p 2L NS in the ED   E: replete K<4, Mg<2  N: regular  VTE Prophylaxis: None, Improve 0  GI: home pepcid  C: Full Code  D: [facility]

## 2023-05-28 NOTE — DISCHARGE NOTE NURSING/CASE MANAGEMENT/SOCIAL WORK - PATIENT PORTAL LINK FT
You can access the FollowMyHealth Patient Portal offered by [facility] by registering at the following website: [email or web address]. By joining [facility]’s FollowMyHealth portal, you will also be able to view your health information using other applications (apps) compatible with our system.

## 2023-05-28 NOTE — DISCHARGE NOTE PROVIDER - NSDCCPTREATMENT_GEN_ALL_CORE_FT
PRINCIPAL PROCEDURE  Procedure: CTA chest w/w/o contrast  Findings and Treatment: 5/26/23  PROCEDURE:  CT Angiography of the Chest.  Sagittal and coronal reformats were performed as well as 3D (MIP)   reconstructions.  --  FINDINGS:  LUNGS AND AIRWAYS: Patent central airways.  Lungs are clear. There is no   central pulmonary embolism. Focal outpouching of the main pulmonary   artery, measuring 3.6 cm transversely.No evidence of right heartstrain.  PLEURA: No pleural effusion.  MEDIASTINUM AND RIDDHI: No lymphadenopathy.  VESSELS: Within normal limits.  HEART: Heart size is normal. No pericardial effusion.  CHEST WALL AND LOWER NECK: Within normal limits.  VISUALIZED UPPER ABDOMEN: Hepatic steatosis. Splenomegaly.  BONES: Mild degenerative changes.  --  IMPRESSION:  -There is a 3.6 cm focal outpouching of the main pulmonary artery, which   could represent pulmonary artery aneurysm.  -No central pulmonary embolism.

## 2023-05-28 NOTE — DISCHARGE NOTE PROVIDER - NSDCCPCAREPLAN_GEN_ALL_CORE_FT
PRINCIPAL DISCHARGE DIAGNOSIS  Diagnosis: Pulmonary artery aneurysm  Assessment and Plan of Treatment:      PRINCIPAL DISCHARGE DIAGNOSIS  Diagnosis: Pulmonary artery aneurysm  Assessment and Plan of Treatment: An aneurysm is an abnormal bulge or ballooning in the wall of a blood vessel. Yours was found in your pulmonary artery. It was small and considered low risk for rupture, which causes bleeding. Some small aneurysms have a low risk of rupture. To determine the risk of an aneurysm rupture, a health care provider considers your symptoms, family history, and other factors.   --  The lung doctor and surgery team evaluated you and it is appropriate to have your work-up outpatient. This includes the below:   -Autoimmune work-up (these labs were done, such as RAFAEL, scleroderma antibodies, anti-JO1), and they are pending. You will be called if any of them are positive   -You also need an ECHO (TTE). Please ensure with your doctor you receive this outpatient on discharge   --  FOLLOW-UP   -Please follow up in one month with our CT-surgery team, Dr. Crowley. You can call [phone] to reach out and make an appointment. The address is below in your paperwork     PRINCIPAL DISCHARGE DIAGNOSIS  Diagnosis: Pulmonary artery aneurysm  Assessment and Plan of Treatment: An aneurysm is an abnormal bulge or ballooning in the wall of a blood vessel. Yours was found in your pulmonary artery. It was small and considered low risk for rupture, which causes bleeding. Some small aneurysms have a low risk of rupture. To determine the risk of an aneurysm rupture, a health care provider considers your symptoms, family history, and other factors.   --  The lung doctor and surgery team evaluated you and it is appropriate to have your work-up outpatient. This includes the below:   -Autoimmune work-up (these labs were done, such as RAFAEL, scleroderma antibodies, anti-JO1), and they are pending. You will be called if any of them are positive   -You also need an ECHO (TTE). Please ensure with your doctor you receive this outpatient on discharge   --  FOLLOW-UP   -Please follow up in one month with our CT-surgery team, Dr. Crowley. You can call [phone] to reach out and make an appointment. The address is below in your paperwork      SECONDARY DISCHARGE DIAGNOSES  Diagnosis: GERD (gastroesophageal reflux disease)  Assessment and Plan of Treatment: You were having symptoms of GERD on your admission and started on Pepcid. You can obtain this over the counter.   --  Don’t eat large meals. Eat smaller meals more often. This will allow you to eat the same amount of food, but in smaller portions that will be easier to digest.  Don’t lie down for at least 2 to 3 hours after eating.  Avoid late-night snacks.  Avoid some foods, such as:  Peppermint and spearmint candy, gum, and mints  Fried or fatty foods  Avoid some drinks, such as:  Acidic juices, such as orange juice  Alcohol  Peppermint and spearmint tea  Caffeinated drinks, such as coffee and tea  Carbonated (fizzy) drinks, such as soda  --  Please follow-up with Dr. Franco office (gastroenterologist) to follow up that your symptoms improve. His information is listed at the bottom of your discharge paperwork.        PRINCIPAL DISCHARGE DIAGNOSIS  Diagnosis: Pulmonary artery aneurysm  Assessment and Plan of Treatment: An aneurysm is an abnormal bulge or ballooning in the wall of a blood vessel. Yours was found in your pulmonary artery. It was small and considered low risk for rupture, which causes bleeding. Some small aneurysms have a low risk of rupture. To determine the risk of an aneurysm rupture, a health care provider considers your symptoms, family history, and other factors.   --  The lung doctor and surgery team evaluated you and it is appropriate to have your work-up outpatient. This includes the below:   -Autoimmune work-up (these labs were done, such as RAFAEL, scleroderma antibodies, anti-JO1), and they are pending. You will be called if any of them are positive   -Your ECHO (TTE) done on 5/28 revealed no acute abnormalities to suggest the aneurysm was caused by a structural heart problem or due to high blood pressures in the lung  --  FOLLOW-UP   -Please follow up in one month with our CT-surgery team, Dr. Crowley. You can call [phone] to reach out and make an appointment. The address is below in your paperwork      SECONDARY DISCHARGE DIAGNOSES  Diagnosis: GERD (gastroesophageal reflux disease)  Assessment and Plan of Treatment: You were having symptoms of GERD on your admission and started on Pepcid. You can obtain this over the counter.   --  Don’t eat large meals. Eat smaller meals more often. This will allow you to eat the same amount of food, but in smaller portions that will be easier to digest.  Don’t lie down for at least 2 to 3 hours after eating.  Avoid late-night snacks.  Avoid some foods, such as:  Peppermint and spearmint candy, gum, and mints  Fried or fatty foods  Avoid some drinks, such as:  Acidic juices, such as orange juice  Alcohol  Peppermint and spearmint tea  Caffeinated drinks, such as coffee and tea  Carbonated (fizzy) drinks, such as soda  --  Please follow-up with Dr. Franco office (gastroenterologist) to follow up that your symptoms improve. His information is listed at the bottom of your discharge paperwork.

## 2023-05-28 NOTE — CHART NOTE - NSCHARTNOTEFT_GEN_A_CORE
Limited TTE Note:    Indication: Enlarged PA  Quality: fair  Findings: Normal biventricular size and function. No significant valve abnormalities. PASP ~21 mm Hg.     Full report to follow.
Pt was reviewed with Dr. Mayorga today, CTS. No need for intervention or further evaluation. Patient should follow up with structural heart team within about 1 month of discharge from the hospital for possible repeat imaging and evaluation. Called primary team to discuss plan.   Office information: [address] [place] [address] 4th floor, [phone]. Pt can see any of the structural heart attendings (Dr. Crowley, Dr. Miller, Dr. Shepherd).

## 2023-05-28 NOTE — DISCHARGE NOTE PROVIDER - ATTENDING DISCHARGE PHYSICAL EXAMINATION:
PHYSICAL EXAM:      Constitutional: NAD, well-groomed, well-developed  HEENT: PERRLA, EOMI, Normal Hearing, MMM  Neck: No LAD, No JVD  Back: Normal spine flexure, No CVA tenderness  Respiratory: CTAB  Cardiovascular: S1 and S2, RRR, no M/G/R  Gastrointestinal: BS+, soft, NT/ND  Extremities: No peripheral edema  Vascular: 2+ peripheral pulses  Neurological: A/O x 3, no focal deficits  Psychiatric: Normal mood, normal affect  Musculoskeletal: 5/5 strength b/l upper and lower extremities  Skin: No rashes

## 2023-05-28 NOTE — DISCHARGE NOTE PROVIDER - HOSPITAL COURSE
#Discharge: do not delete    Patient is __ yo M/F with past medical history of _____, presented with _____, found to have _____    Inpatient treatment course:     Problem List/Main Diagnoses:     New medications/therapies:   New lines/hardware:  Labs to be followed outpatient:   Exam to be followed outpatient:     Discharge plan: discharge to ______    Physical exam on discharge:        36 year old female presenting with SOB, palpitations, chills, vomiting, dizziness, worse with standing for 4 days. Sent to urgent care found to be tachycardic and referred to [facility] ER. 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 Symptomatically, patient is improving, on room air, with autoimmune work-up pending and ECHO pending to rule out congenital heart defects. Per CTS and pulmonology team, this does not necessitate inpatient work-up, patient is stable for discharge and will follow-up with outpatient ECHO and CTS, and remaining autoimmune work-up for further work-up of pulmonary artery aneursym.     New medications/therapies: None  New lines/hardware: None  Labs to be followed outpatient:  RAFAEL, aldolase, C3, C4, anti-CCP, anti-LUIGI-1,   Exam to be followed outpatient: TTE     Discharge plan: discharge to home    Physical exam on discharge:   General: NAD, laying in bed, speaking in full sentences, young, obese female  HEENT: head NC/AT, no conjunctival injection, EOMI, MMM  Neck: supple, no JVD  Cardio: RRR, +S1/S2, no M/R/G  Resp: lungs CTAB, no cough/wheezes/rales/rhonchi, on room air without increased WOB  Abdo: soft, increased body habitus, NT, ND, +bowel sounds x4, no organomegaly or palpable mass    Extremities: WWP, no edema/cyanosis/clubbing   Vasc: 2+ radial and DP pulses b/l  Neuro: A&Ox3, no focal deficits  Psych: speech non-pressured, thoughts goal-oriented  Skin: dry, intact, no visible jaundice   MSK: no joint swelling 36 year old female presenting with SOB, palpitations, chills, vomiting, dizziness, worse with standing for 4 days. Sent to urgent care found to be tachycardic and referred to [facility] ER. 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 Symptomatically, patient is improving, on room air, with autoimmune work-up pending and ECHO completed 5/28, which revealed a normal study without evidence of structural heart problems, pulmonary htn, or congenital heart defects. Per CTS and pulmonology team, this does not necessitate further inpatient work-up, patient is stable for discharge and will follow-up with outpatient CTS, and remaining autoimmune work-up for further work-up of pulmonary artery aneursym. Patient stable for discharge home, resolution of her SOB and is no distress on room air.     #Pulmonary artery aneursym   Assessment as plan as above  -Follow-up with Dr. Crowley (CTS) outpatient within 1 month    #GERD  Patient on famotidine during admission due to reflux symptoms, improved, requesting to see GI outpatient  -Follow up with Dr. Mayes (GI) outpatient     New medications/therapies: None  New lines/hardware: None  Labs to be followed outpatient:  RAFAEL, aldolase, C3, C4, anti-CCP, anti-LUIGI-1, scleroderma antibodies   Exam to be followed outpatient: None    Discharge plan: discharge to home    Physical exam on discharge:   General: NAD, laying in bed, speaking in full sentences, young, obese female  HEENT: head NC/AT, no conjunctival injection, EOMI, MMM  Neck: supple, no JVD  Cardio: RRR, +S1/S2, no M/R/G  Resp: lungs CTAB, no cough/wheezes/rales/rhonchi, on room air without increased WOB  Abdo: soft, increased body habitus, NT, ND, +bowel sounds x4, no organomegaly or palpable mass    Extremities: WWP, no edema/cyanosis/clubbing   Vasc: 2+ radial and DP pulses b/l  Neuro: A&Ox3, no focal deficits  Psych: speech non-pressured, thoughts goal-oriented  Skin: dry, intact, no visible jaundice   MSK: no joint swelling

## 2023-05-28 NOTE — PROGRESS NOTE ADULT - PROBLEM SELECTOR PLAN 2
ddx include congenital vs post infectious vs ph related.   pending TTE. Family prefers to obtain inpatient after discussion with pulmonary service.   no further plans from CTS.

## 2023-05-28 NOTE — PROGRESS NOTE ADULT - SUBJECTIVE AND OBJECTIVE BOX
INTERVAL HPI/OVERNIGHT EVENTS: No interval events. No dyspnea or chest pain today. No further fever. Minimal subjective chills. Has cough and feelings of reflux post meals.     VITAL SIGNS:  T(F): 98.2 (23 @ 12:00)  HR: 75 (23 @ 12:00)  BP: 121/73 (23 @ 12:00)  RR: 16 (23 @ 12:00)  SpO2: 97% (23 @ 12:00)  Wt(kg): --    PHYSICAL EXAM:  Constitutional: NAD, well-groomed, well-developed  HEENT: PERRLA, EOMI, Normal Hearing, MMM  Neck: No LAD, No JVD  Back: Normal spine flexure, No CVA tenderness  Respiratory: CTAB  Cardiovascular: S1 and S2, RRR, no M/G/R  Gastrointestinal: BS+, soft, NT/ND  Extremities: No peripheral edema  Vascular: 2+ peripheral pulses  Neurological: A/O x 3, no focal deficits  Psychiatric: Normal mood, normal affect  Musculoskeletal: 5/5 strength b/l upper and lower extremities  Skin: No rashes        MEDICATIONS  (STANDING):  famotidine    Tablet 20 milliGRAM(s) Oral daily  ferrous    sulfate 325 milliGRAM(s) Oral <User Schedule>    MEDICATIONS  (PRN):  acetaminophen     Tablet .. 650 milliGRAM(s) Oral every 6 hours PRN Temp greater or equal to 38C (100.4F), Mild Pain (1 - 3)  aluminum hydroxide/magnesium hydroxide/simethicone Suspension 30 milliLiter(s) Oral every 4 hours PRN Dyspepsia  melatonin 3 milliGRAM(s) Oral at bedtime PRN Insomnia  ondansetron Injectable 4 milliGRAM(s) IV Push every 8 hours PRN Nausea and/or Vomiting      Allergies    No Known Allergies    Intolerances        LABS:                        9.8    4.64  )-----------( 274      ( 28 May 2023 06:58 )             31.6         138  |  105  |  12  ----------------------------<  98  4.0   |  23  |  0.75    Ca    9.0      28 May 2023 06:58  Phos  3.1       Mg     2.2         TPro  6.8  /  Alb  3.4  /  TBili  0.2  /  DBili  x   /  AST  31  /  ALT  40  /  AlkPhos  77      PT/INR - ( 26 May 2023 19:31 )   PT: 13.2 sec;   INR: 1.11          PTT - ( 26 May 2023 19:31 )  PTT:27.7 sec  Urinalysis Basic - ( 26 May 2023 19:58 )    Color: Yellow / Appearance: Clear / S.015 / pH: x  Gluc: x / Ketone: NEGATIVE  / Bili: Negative / Urobili: 0.2 E.U./dL   Blood: x / Protein: NEGATIVE mg/dL / Nitrite: NEGATIVE   Leuk Esterase: NEGATIVE / RBC: 5-10 /HPF / WBC < 5 /HPF   Sq Epi: x / Non Sq Epi: x / Bacteria: Present /HPF        RADIOLOGY & ADDITIONAL TESTS:

## 2023-05-28 NOTE — DISCHARGE NOTE PROVIDER - CARE PROVIDER_API CALL
Ozzy Crowley  Interventional Cardiology  [address] [place] [address]  Phone: [phone]  Fax: [phone]  Follow Up Time: 1 month   Ozzy Crowley  Interventional Cardiology  [address], [address] [place] [address]  Phone: [phone]  Fax: [phone]  Follow Up Time: 1 month    Pedro Luis Mayes  Gastroenterology  [address], [address]  [place] [address]  Phone: [phone]  Fax: [phone]  Follow Up Time: 1 month

## 2023-05-28 NOTE — DISCHARGE NOTE PROVIDER - PROVIDER TOKENS
PROVIDER:[TOKEN:[[id number]],FOLLOWUP:[1 month]] PROVIDER:[TOKEN:[[id number]],FOLLOWUP:[1 month]],PROVIDER:[TOKEN:[[id number]],FOLLOWUP:[1 month]]

## 2023-05-29 LAB
ANA TITR SER: NEGATIVE — SIGNIFICANT CHANGE UP
C3 SERPL-MCNC: 192 MG/DL — HIGH (ref 81–157)
C4 SERPL-MCNC: 73 MG/DL — HIGH (ref 13–39)
CCP IGG SERPL-ACNC: <8 UNITS — SIGNIFICANT CHANGE UP
ENA JO1 AB SER-ACNC: <0.2 AI — SIGNIFICANT CHANGE UP
ENA SCL70 AB SER-ACNC: <0.2 AI — SIGNIFICANT CHANGE UP
RF+CCP IGG SER-IMP: NEGATIVE — SIGNIFICANT CHANGE UP

## 2023-05-29 PROCEDURE — 93308 TTE F-UP OR LMTD: CPT | Mod: 26

## 2023-05-30 LAB
-  CLINDAMYCIN: SIGNIFICANT CHANGE UP
-  LEVOFLOXACIN: SIGNIFICANT CHANGE UP
-  PENICILLIN: SIGNIFICANT CHANGE UP
-  VANCOMYCIN: SIGNIFICANT CHANGE UP
ALDOLASE SERPL-CCNC: 9.4 U/L — SIGNIFICANT CHANGE UP (ref 3.3–10.3)
AUTO DIFF PNL BLD: NEGATIVE — SIGNIFICANT CHANGE UP
C-ANCA SER-ACNC: NEGATIVE — SIGNIFICANT CHANGE UP
CULTURE RESULTS: SIGNIFICANT CHANGE UP
METHOD TYPE: SIGNIFICANT CHANGE UP
ORGANISM # SPEC MICROSCOPIC CNT: SIGNIFICANT CHANGE UP
ORGANISM # SPEC MICROSCOPIC CNT: SIGNIFICANT CHANGE UP
P-ANCA SER-ACNC: NEGATIVE — SIGNIFICANT CHANGE UP
SPECIMEN SOURCE: SIGNIFICANT CHANGE UP

## 2023-05-31 PROBLEM — Z00.00 ENCOUNTER FOR PREVENTIVE HEALTH EXAMINATION: Status: ACTIVE | Noted: 2023-05-31

## 2023-05-31 PROBLEM — Z78.9 OTHER SPECIFIED HEALTH STATUS: Chronic | Status: ACTIVE | Noted: 2023-05-26

## 2023-05-31 LAB
CULTURE RESULTS: SIGNIFICANT CHANGE UP
CULTURE RESULTS: SIGNIFICANT CHANGE UP
SPECIMEN SOURCE: SIGNIFICANT CHANGE UP
SPECIMEN SOURCE: SIGNIFICANT CHANGE UP

## 2023-06-01 DIAGNOSIS — E66.9 OBESITY, UNSPECIFIED: ICD-10-CM

## 2023-06-01 DIAGNOSIS — Z98.84 BARIATRIC SURGERY STATUS: ICD-10-CM

## 2023-06-01 DIAGNOSIS — Z86.16 PERSONAL HISTORY OF COVID-19: ICD-10-CM

## 2023-06-01 DIAGNOSIS — N92.0 EXCESSIVE AND FREQUENT MENSTRUATION WITH REGULAR CYCLE: ICD-10-CM

## 2023-06-01 DIAGNOSIS — K21.9 GASTRO-ESOPHAGEAL REFLUX DISEASE WITHOUT ESOPHAGITIS: ICD-10-CM

## 2023-06-01 DIAGNOSIS — I28.0 ARTERIOVENOUS FISTULA OF PULMONARY VESSELS: ICD-10-CM

## 2023-06-01 DIAGNOSIS — D50.8 OTHER IRON DEFICIENCY ANEMIAS: ICD-10-CM

## 2023-06-01 DIAGNOSIS — B34.9 VIRAL INFECTION, UNSPECIFIED: ICD-10-CM

## 2023-06-01 DIAGNOSIS — R65.10 SYSTEMIC INFLAMMATORY RESPONSE SYNDROME (SIRS) OF NON-INFECTIOUS ORIGIN WITHOUT ACUTE ORGAN DYSFUNCTION: ICD-10-CM

## 2023-06-16 LAB — MI2 AB SER QL: NEGATIVE — SIGNIFICANT CHANGE UP

## 2023-08-28 ENCOUNTER — NON-APPOINTMENT (OUTPATIENT)
Age: 38
End: 2023-08-28

## 2023-08-28 ENCOUNTER — APPOINTMENT (OUTPATIENT)
Dept: RADIOLOGY | Facility: HOSPITAL | Age: 38
End: 2023-08-28
Payer: COMMERCIAL

## 2023-08-28 ENCOUNTER — APPOINTMENT (OUTPATIENT)
Dept: CARDIOTHORACIC SURGERY | Facility: CLINIC | Age: 38
End: 2023-08-28
Payer: COMMERCIAL

## 2023-08-28 ENCOUNTER — OUTPATIENT (OUTPATIENT)
Dept: OUTPATIENT SERVICES | Facility: HOSPITAL | Age: 38
LOS: 1 days | End: 2023-08-28
Payer: COMMERCIAL

## 2023-08-28 VITALS
DIASTOLIC BLOOD PRESSURE: 83 MMHG | WEIGHT: 293 LBS | HEART RATE: 85 BPM | RESPIRATION RATE: 17 BRPM | HEIGHT: 63 IN | SYSTOLIC BLOOD PRESSURE: 136 MMHG | BODY MASS INDEX: 51.91 KG/M2 | OXYGEN SATURATION: 96 % | TEMPERATURE: 97.7 F

## 2023-08-28 DIAGNOSIS — F12.90 CANNABIS USE, UNSPECIFIED, UNCOMPLICATED: ICD-10-CM

## 2023-08-28 DIAGNOSIS — Z78.9 OTHER SPECIFIED HEALTH STATUS: ICD-10-CM

## 2023-08-28 PROCEDURE — 74220 X-RAY XM ESOPHAGUS 1CNTRST: CPT | Mod: 26

## 2023-08-28 PROCEDURE — 99215 OFFICE O/P EST HI 40 MIN: CPT

## 2023-08-28 PROCEDURE — 74220 X-RAY XM ESOPHAGUS 1CNTRST: CPT

## 2023-08-30 ENCOUNTER — TRANSCRIPTION ENCOUNTER (OUTPATIENT)
Age: 38
End: 2023-08-30

## 2023-08-30 ENCOUNTER — OUTPATIENT (OUTPATIENT)
Dept: OUTPATIENT SERVICES | Facility: HOSPITAL | Age: 38
LOS: 1 days | Discharge: HOME | End: 2023-08-30
Payer: COMMERCIAL

## 2023-08-30 VITALS — HEIGHT: 63 IN | WEIGHT: 293 LBS

## 2023-08-30 PROCEDURE — 88305 TISSUE EXAM BY PATHOLOGIST: CPT

## 2023-08-30 PROCEDURE — 43239 EGD BIOPSY SINGLE/MULTIPLE: CPT

## 2023-08-30 PROCEDURE — 88305 TISSUE EXAM BY PATHOLOGIST: CPT | Mod: 26

## 2023-08-31 LAB — SURGICAL PATHOLOGY STUDY: SIGNIFICANT CHANGE UP

## 2023-09-01 PROBLEM — F12.90 OCCASIONAL USE OF MARIJUANA: Status: ACTIVE | Noted: 2023-09-01

## 2023-09-01 PROBLEM — Z78.9 ALCOHOL USE: Status: ACTIVE | Noted: 2023-09-01

## 2023-09-01 NOTE — PLAN
[TextEntry] : 1) will be referred to Dr. Multani for evaluation of possible pHTN  2) Dr. Vijaya Sidhu for congenital cardiology care 3)  will follow up with rheumatologist  I, Lakia KENNY, am scribing for Dr. Ozzy Crowley the following sections: HISTORY OF PRESENT ILLNESS, PAST MEDICAL/FAMILY/SOCIAL HISTORY, REVIEW OF SYSTEMS, VITAL SIGNS, PHYSICAL EXAM AND DISPOSITION.  I personally performed the services described in the documentation and reviewed the documented recorded by the scribe in my presence; it accurately and completely records my words and actions

## 2023-09-01 NOTE — HISTORY OF PRESENT ILLNESS
[FreeTextEntry1] : 37 year old female with a past medical history of enlarged thyroid, COVID in 4/2023, dilated pulmonary artery who was referred for further evaluation.   who presented to the 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 Symptomatically, patient is improving, on room air, with autoimmune work-up pending and ECHO completed 5/28, which revealed a normal study without evidence of structural heart problems, pulmonary htn, or congenital heart defects.  The patient is seeing the rheumatologist on 11/9/2023.   The patient states she is feeling well with no complaints. She denies any SOB at rest or with exertion, chest pain, palpitations, dizziness, syncope, or LE edema.   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

## 2023-09-01 NOTE — ASSESSMENT
[FreeTextEntry1] : 37 year old female with a past medical history of enlarged thyroid, COVID in 4/2023, dilated pulmonary artery who was referred for further evaluation.  The CT was reviewed by Dr. Crowley shows dilated pulmonary artery- discussed with the patient, no intervention required. Patient will be referred to Dr. Multani for evaluation of possible pHTN and Dr. Vijaya Sidhu for congenital cardiology care. She will follow up with rheumatologist. All questions answered.   Spent 45 minutes with the patient

## 2023-09-01 NOTE — PHYSICAL EXAM
[No Acute Distress] : no acute distress [Normal Conjunctiva] : normal conjunctiva [No Murmur] : no murmur [Clear Lung Fields] : clear lung fields [Good Air Entry] : good air entry [Soft] : abdomen soft [Non Tender] : non-tender [Normal Gait] : normal gait [No Edema] : no edema [No Rash] : no rash [Moves all extremities] : moves all extremities [Alert and Oriented] : alert and oriented

## 2023-11-01 ENCOUNTER — APPOINTMENT (OUTPATIENT)
Dept: PULMONOLOGY | Facility: CLINIC | Age: 38
End: 2023-11-01
Payer: COMMERCIAL

## 2023-11-01 DIAGNOSIS — I28.1 ANEURYSM OF PULMONARY ARTERY: ICD-10-CM

## 2023-11-01 PROCEDURE — 99205 OFFICE O/P NEW HI 60 MIN: CPT

## 2023-11-09 ENCOUNTER — NON-APPOINTMENT (OUTPATIENT)
Age: 38
End: 2023-11-09

## 2023-11-09 ENCOUNTER — OUTPATIENT (OUTPATIENT)
Dept: OUTPATIENT SERVICES | Facility: HOSPITAL | Age: 38
LOS: 1 days | End: 2023-11-09
Payer: COMMERCIAL

## 2023-11-09 ENCOUNTER — APPOINTMENT (OUTPATIENT)
Dept: CT IMAGING | Facility: HOSPITAL | Age: 38
End: 2023-11-09
Payer: COMMERCIAL

## 2023-11-09 ENCOUNTER — APPOINTMENT (OUTPATIENT)
Dept: RHEUMATOLOGY | Facility: CLINIC | Age: 38
End: 2023-11-09
Payer: COMMERCIAL

## 2023-11-09 VITALS
SYSTOLIC BLOOD PRESSURE: 109 MMHG | TEMPERATURE: 98.2 F | WEIGHT: 293 LBS | BODY MASS INDEX: 51.91 KG/M2 | DIASTOLIC BLOOD PRESSURE: 76 MMHG | HEIGHT: 63 IN | HEART RATE: 93 BPM | OXYGEN SATURATION: 98 %

## 2023-11-09 DIAGNOSIS — I28.8 OTHER DISEASES OF PULMONARY VESSELS: ICD-10-CM

## 2023-11-09 PROCEDURE — 99204 OFFICE O/P NEW MOD 45 MIN: CPT | Mod: 25

## 2023-11-09 PROCEDURE — 36415 COLL VENOUS BLD VENIPUNCTURE: CPT

## 2023-11-09 PROCEDURE — 71275 CT ANGIOGRAPHY CHEST: CPT

## 2023-11-09 PROCEDURE — 71275 CT ANGIOGRAPHY CHEST: CPT | Mod: 26

## 2023-11-09 RX ORDER — MEDROXYPROGESTERONE ACETATE 5 MG/1
TABLET ORAL
Refills: 0 | Status: ACTIVE | COMMUNITY

## 2023-11-09 RX ORDER — FERROUS GLUCONATE 256(28)MG
325 TABLET ORAL
Refills: 0 | Status: ACTIVE | COMMUNITY

## 2023-11-10 ENCOUNTER — NON-APPOINTMENT (OUTPATIENT)
Age: 38
End: 2023-11-10

## 2023-11-10 LAB
C3 SERPL-MCNC: 179 MG/DL
C4 SERPL-MCNC: 45 MG/DL
CRP SERPL-MCNC: 20 MG/L
ERYTHROCYTE [SEDIMENTATION RATE] IN BLOOD BY WESTERGREN METHOD: 67 MM/HR

## 2023-11-12 LAB
ENA SS-A AB SER IA-ACNC: <0.2 AL
ENA SS-B AB SER IA-ACNC: <0.2 AL

## 2023-11-14 ENCOUNTER — NON-APPOINTMENT (OUTPATIENT)
Age: 38
End: 2023-11-14

## 2023-11-29 ENCOUNTER — APPOINTMENT (OUTPATIENT)
Dept: RHEUMATOLOGY | Facility: CLINIC | Age: 38
End: 2023-11-29
Payer: COMMERCIAL

## 2023-11-29 LAB
HLA METHODOLOGY: NORMAL
HLA-B ALLELE 1: NORMAL
HLA-B ALLELE 2: NORMAL

## 2023-11-29 PROCEDURE — 99441: CPT

## 2024-07-31 NOTE — PRE-ANESTHESIA EVALUATION ADULT - NSANTHPMHFT_GEN_ALL_CORE
[Normal] : normoactive bowel sounds, soft and nontender, no hepatosplenomegaly or masses appreciated [de-identified] : deferred (recent mammo/sono on 7/16/24) LAP BAND and REMOVAL  OVARIAN CYST

## (undated) DEVICE — FORCEP RADIAL JAW 4 W NDL 2.2MM 2.8MM 240CM ORANGE DISP